# Patient Record
Sex: MALE | Race: WHITE | NOT HISPANIC OR LATINO | URBAN - METROPOLITAN AREA
[De-identification: names, ages, dates, MRNs, and addresses within clinical notes are randomized per-mention and may not be internally consistent; named-entity substitution may affect disease eponyms.]

---

## 2022-02-03 ENCOUNTER — OFFICE VISIT (OUTPATIENT)
Dept: URBAN - METROPOLITAN AREA CLINIC 46 | Facility: CLINIC | Age: 32
End: 2022-02-03
Payer: COMMERCIAL

## 2022-02-03 VITALS
HEIGHT: 68 IN | WEIGHT: 196.8 LBS | DIASTOLIC BLOOD PRESSURE: 68 MMHG | BODY MASS INDEX: 29.83 KG/M2 | HEART RATE: 74 BPM | SYSTOLIC BLOOD PRESSURE: 121 MMHG | TEMPERATURE: 98.7 F

## 2022-02-03 DIAGNOSIS — R19.8 ALTERED BOWEL FUNCTION: ICD-10-CM

## 2022-02-03 PROCEDURE — 99204 OFFICE O/P NEW MOD 45 MIN: CPT | Performed by: INTERNAL MEDICINE

## 2022-02-03 RX ORDER — DEXTROAMPHETAMINE SULFATE, DEXTROAMPHETAMINE SACCHARATE, AMPHETAMINE SULFATE AND AMPHETAMINE ASPARTATE 7.5; 7.5; 7.5; 7.5 MG/1; MG/1; MG/1; MG/1
1 CAPSULE IN THE MORNING CAPSULE, EXTENDED RELEASE ORAL ONCE A DAY
Status: ACTIVE | COMMUNITY

## 2022-02-03 RX ORDER — DICYCLOMINE HYDROCHLORIDE 10 MG/1
1 TABLET CAPSULE ORAL
Qty: 60 | Refills: 3 | OUTPATIENT
Start: 2022-02-03 | End: 2022-06-03

## 2022-02-03 NOTE — HPI-TODAY'S VISIT:
Pt presents from PCP with 1 year of progressive change in bowels with loose stools 5-10 per day; small volume; scattered; occasional bloody mucus; tenesmus. No systemic complaints. No weight loss. No rash or joint pain. No famly hx of CRC or IBD. No testing.

## 2022-02-08 ENCOUNTER — CLAIMS CREATED FROM THE CLAIM WINDOW (OUTPATIENT)
Dept: URBAN - METROPOLITAN AREA CLINIC 4 | Facility: CLINIC | Age: 32
End: 2022-02-08
Payer: COMMERCIAL

## 2022-02-08 ENCOUNTER — OFFICE VISIT (OUTPATIENT)
Dept: URBAN - METROPOLITAN AREA SURGERY CENTER 27 | Facility: SURGERY CENTER | Age: 32
End: 2022-02-08
Payer: COMMERCIAL

## 2022-02-08 DIAGNOSIS — K51.919 ULCERATIVE COLITIS, UNSPECIFIED WITH UNSPECIFIED COMPLICATIONS: ICD-10-CM

## 2022-02-08 DIAGNOSIS — R19.4 ALTERATION IN BOWEL ELIMINATION: ICD-10-CM

## 2022-02-08 DIAGNOSIS — K51.919 ACUTE ULCERATIVE COLITIS WITH COMPLICATION: ICD-10-CM

## 2022-02-08 DIAGNOSIS — K63.89 GASEOUS DISTENTION OF INTESTINE DETERMINED BY X-RAY: ICD-10-CM

## 2022-02-08 DIAGNOSIS — K62.5 ANAL BLEEDING: ICD-10-CM

## 2022-02-08 PROCEDURE — 88305 TISSUE EXAM BY PATHOLOGIST: CPT | Performed by: PATHOLOGY

## 2022-02-08 PROCEDURE — 45380 COLONOSCOPY AND BIOPSY: CPT | Performed by: INTERNAL MEDICINE

## 2022-02-08 PROCEDURE — G8907 PT DOC NO EVENTS ON DISCHARG: HCPCS | Performed by: INTERNAL MEDICINE

## 2022-02-08 RX ORDER — DICYCLOMINE HYDROCHLORIDE 10 MG/1
1 TABLET CAPSULE ORAL
Qty: 60 | Refills: 3 | Status: ACTIVE | COMMUNITY
Start: 2022-02-03 | End: 2022-06-03

## 2022-02-08 RX ORDER — MESALAMINE 375 MG/1
4 CAPSULES IN THE MORNING CAPSULE, EXTENDED RELEASE ORAL ONCE A DAY
Qty: 120 | Refills: 3 | OUTPATIENT
Start: 2022-02-08 | End: 2022-06-08

## 2022-02-08 RX ORDER — BUDESONIDE 9 MG/1
1 TABLET IN THE MORNING TABLET, EXTENDED RELEASE ORAL ONCE A DAY
Qty: 60 | Refills: 0 | OUTPATIENT
Start: 2022-02-08 | End: 2022-03-10

## 2022-02-08 RX ORDER — DEXTROAMPHETAMINE SULFATE, DEXTROAMPHETAMINE SACCHARATE, AMPHETAMINE SULFATE AND AMPHETAMINE ASPARTATE 7.5; 7.5; 7.5; 7.5 MG/1; MG/1; MG/1; MG/1
1 CAPSULE IN THE MORNING CAPSULE, EXTENDED RELEASE ORAL ONCE A DAY
Status: ACTIVE | COMMUNITY

## 2022-03-21 ENCOUNTER — CLAIMS CREATED FROM THE CLAIM WINDOW (OUTPATIENT)
Dept: URBAN - METROPOLITAN AREA CLINIC 48 | Facility: CLINIC | Age: 32
End: 2022-03-21
Payer: COMMERCIAL

## 2022-03-21 VITALS
TEMPERATURE: 98.6 F | WEIGHT: 190 LBS | DIASTOLIC BLOOD PRESSURE: 78 MMHG | SYSTOLIC BLOOD PRESSURE: 129 MMHG | HEIGHT: 68 IN | BODY MASS INDEX: 28.79 KG/M2 | OXYGEN SATURATION: 97 % | HEART RATE: 74 BPM

## 2022-03-21 DIAGNOSIS — K51.30 ULCERATIVE RECTOSIGMOIDITIS WITHOUT COMPLICATION: ICD-10-CM

## 2022-03-21 PROCEDURE — 99213 OFFICE O/P EST LOW 20 MIN: CPT | Performed by: PHYSICIAN ASSISTANT

## 2022-03-21 PROCEDURE — 99213 OFFICE O/P EST LOW 20 MIN: CPT | Performed by: INTERNAL MEDICINE

## 2022-03-21 RX ORDER — MESALAMINE 375 MG/1
4 CAPSULES IN THE MORNING CAPSULE, EXTENDED RELEASE ORAL ONCE A DAY
Qty: 120 | Refills: 3 | Status: ON HOLD | COMMUNITY
Start: 2022-02-08 | End: 2022-06-08

## 2022-03-21 RX ORDER — OXYCODONE HYDROCHLORIDE AND ACETAMINOPHEN 500 MG
1 TABLET TABLET ORAL ONCE A DAY
Status: ACTIVE | COMMUNITY

## 2022-03-21 RX ORDER — PREDNISONE 20 MG/1
1 TABLET TABLET ORAL
Qty: 90 | Refills: 0 | OUTPATIENT
Start: 2022-03-21 | End: 2022-04-20

## 2022-03-21 RX ORDER — LORATADINE 10 MG/1
1 TABLET TABLET ORAL ONCE A DAY
Status: ACTIVE | COMMUNITY

## 2022-03-21 RX ORDER — DEXTROAMPHETAMINE SULFATE, DEXTROAMPHETAMINE SACCHARATE, AMPHETAMINE SULFATE AND AMPHETAMINE ASPARTATE 7.5; 7.5; 7.5; 7.5 MG/1; MG/1; MG/1; MG/1
1 CAPSULE IN THE MORNING CAPSULE, EXTENDED RELEASE ORAL ONCE A DAY
Status: ACTIVE | COMMUNITY

## 2022-03-21 RX ORDER — CHOLECALCIFEROL (VITAMIN D3) 25 MCG
1 TABLET TABLET,CHEWABLE ORAL ONCE A DAY
Status: ACTIVE | COMMUNITY

## 2022-03-21 RX ORDER — DICYCLOMINE HYDROCHLORIDE 10 MG/1
1 TABLET CAPSULE ORAL
Qty: 60 | Refills: 3 | Status: ACTIVE | COMMUNITY
Start: 2022-02-03 | End: 2022-06-03

## 2022-03-21 RX ORDER — ZINC GLUCONATE 50 MG
1 TABLET TABLET ORAL ONCE A DAY
Status: ACTIVE | COMMUNITY

## 2022-03-21 NOTE — HPI-TODAY'S VISIT:
Patrick Stevens is a 32 y/o male who was seen last month for change in bowels with 5-10 loose stools/day, occasional bloody mucoid stool, and tenesmus. He was given Dicyclomine and Colonoscopy was performed which reavled Ulcerative Colitis confirmed by Bx in the rectosigmoid colon. He was given Uceris and Apriso, but has not taken either due to cost (one was $1000 and the other $400). He gets some symptomatic relief with Dicyclomine, but overall symptoms are unchanged. He has not seen any blood in the stool recently, and believes he has lost 5-6 pounds in the last few months.

## 2022-06-03 ENCOUNTER — OFFICE VISIT (OUTPATIENT)
Dept: URBAN - METROPOLITAN AREA CLINIC 46 | Facility: CLINIC | Age: 32
End: 2022-06-03

## 2022-06-03 ENCOUNTER — OFFICE VISIT (OUTPATIENT)
Dept: URBAN - METROPOLITAN AREA CLINIC 46 | Facility: CLINIC | Age: 32
End: 2022-06-03
Payer: COMMERCIAL

## 2022-06-03 VITALS
HEIGHT: 68 IN | BODY MASS INDEX: 29.37 KG/M2 | SYSTOLIC BLOOD PRESSURE: 115 MMHG | DIASTOLIC BLOOD PRESSURE: 71 MMHG | HEART RATE: 81 BPM | TEMPERATURE: 98.7 F | WEIGHT: 193.8 LBS

## 2022-06-03 DIAGNOSIS — K51.30 ULCERATIVE RECTOSIGMOIDITIS WITHOUT COMPLICATION: ICD-10-CM

## 2022-06-03 PROCEDURE — 99214 OFFICE O/P EST MOD 30 MIN: CPT | Performed by: INTERNAL MEDICINE

## 2022-06-03 RX ORDER — OXYCODONE HYDROCHLORIDE AND ACETAMINOPHEN 500 MG
1 TABLET TABLET ORAL ONCE A DAY
Status: ACTIVE | COMMUNITY

## 2022-06-03 RX ORDER — CHOLECALCIFEROL (VITAMIN D3) 25 MCG
1 TABLET TABLET,CHEWABLE ORAL ONCE A DAY
Status: ACTIVE | COMMUNITY

## 2022-06-03 RX ORDER — LORATADINE 10 MG/1
1 TABLET TABLET ORAL ONCE A DAY
Status: ACTIVE | COMMUNITY

## 2022-06-03 RX ORDER — DICYCLOMINE HYDROCHLORIDE 10 MG/1
1 TABLET CAPSULE ORAL
Qty: 60 | Refills: 3 | Status: ACTIVE | COMMUNITY
Start: 2022-02-03 | End: 2022-06-03

## 2022-06-03 RX ORDER — DEXTROAMPHETAMINE SULFATE, DEXTROAMPHETAMINE SACCHARATE, AMPHETAMINE SULFATE AND AMPHETAMINE ASPARTATE 7.5; 7.5; 7.5; 7.5 MG/1; MG/1; MG/1; MG/1
1 CAPSULE IN THE MORNING CAPSULE, EXTENDED RELEASE ORAL ONCE A DAY
Status: ACTIVE | COMMUNITY

## 2022-06-03 RX ORDER — PREDNISONE 10 MG/1
1 TABLET TABLET ORAL ONCE A DAY
Status: ACTIVE | COMMUNITY

## 2022-06-03 RX ORDER — PREDNISONE 10 MG/1
1 TABLET TABLET ORAL ONCE A DAY
Qty: 60 TABLET | Refills: 1

## 2022-06-03 RX ORDER — ZINC GLUCONATE 50 MG
1 TABLET TABLET ORAL ONCE A DAY
Status: ACTIVE | COMMUNITY

## 2022-06-03 NOTE — HPI-TODAY'S VISIT:
Pt presented 2/2022 with a  change in bowels with 5-10 loose stools/day, occasional bloody mucoid stool, and tenesmus. Colonoscopy was performed which reavled moderate to severe Ulcerative Colitis confirmed by Bx to the sigmoid colon. Pt  was given Uceris and Apriso but did not fill due to cost. Seen for a follow up 3/2022 and Prednisone started. Pt now presents for a follow up; Took 20mg for 4 weeks and then 10mg for 4 weeks and now taking 10mg QOD. Feels 90% better. 2 BM daily that are soft (was 5-6); no blood; no mucus; tenemsum resolved. No EIM's of IBD. No side effects from Prednisone.

## 2022-06-27 ENCOUNTER — ERX REFILL RESPONSE (OUTPATIENT)
Dept: URBAN - METROPOLITAN AREA CLINIC 46 | Facility: CLINIC | Age: 32
End: 2022-06-27

## 2022-06-27 RX ORDER — DICYCLOMINE HYDROCHLORIDE 10 MG/1
TAKE ONE CAPSULE BY MOUTH TWICE A DAY FOR SPASMS CAPSULE ORAL
Qty: 60 CAPSULE | Refills: 0 | OUTPATIENT

## 2022-06-27 RX ORDER — DICYCLOMINE HYDROCHLORIDE 10 MG/1
TAKE ONE CAPSULE BY MOUTH TWICE A DAY FOR SPASMS CAPSULE ORAL
Qty: 60 CAPSULE | Refills: 5 | OUTPATIENT

## 2022-09-01 ENCOUNTER — CLAIMS CREATED FROM THE CLAIM WINDOW (OUTPATIENT)
Dept: URBAN - METROPOLITAN AREA CLINIC 46 | Facility: CLINIC | Age: 32
End: 2022-09-01
Payer: COMMERCIAL

## 2022-09-01 ENCOUNTER — OFFICE VISIT (OUTPATIENT)
Dept: URBAN - METROPOLITAN AREA CLINIC 46 | Facility: CLINIC | Age: 32
End: 2022-09-01

## 2022-09-01 ENCOUNTER — CLAIMS CREATED FROM THE CLAIM WINDOW (OUTPATIENT)
Dept: URBAN - METROPOLITAN AREA CLINIC 46 | Facility: CLINIC | Age: 32
End: 2022-09-01

## 2022-09-01 VITALS
BODY MASS INDEX: 30.74 KG/M2 | SYSTOLIC BLOOD PRESSURE: 130 MMHG | WEIGHT: 202.8 LBS | TEMPERATURE: 98.4 F | DIASTOLIC BLOOD PRESSURE: 79 MMHG | HEART RATE: 69 BPM | HEIGHT: 68 IN

## 2022-09-01 DIAGNOSIS — K51.311 ULCERATIVE RECTOSIGMOIDITIS WITH RECTAL BLEEDING: ICD-10-CM

## 2022-09-01 PROCEDURE — 99213 OFFICE O/P EST LOW 20 MIN: CPT | Performed by: INTERNAL MEDICINE

## 2022-09-01 RX ORDER — CHOLECALCIFEROL (VITAMIN D3) 25 MCG
1 TABLET TABLET,CHEWABLE ORAL ONCE A DAY
Status: ACTIVE | COMMUNITY

## 2022-09-01 RX ORDER — LORATADINE 10 MG/1
1 TABLET TABLET ORAL ONCE A DAY
Status: ACTIVE | COMMUNITY

## 2022-09-01 RX ORDER — OXYCODONE HYDROCHLORIDE AND ACETAMINOPHEN 500 MG
1 TABLET TABLET ORAL ONCE A DAY
Status: ACTIVE | COMMUNITY

## 2022-09-01 RX ORDER — DEXTROAMPHETAMINE SULFATE, DEXTROAMPHETAMINE SACCHARATE, AMPHETAMINE SULFATE AND AMPHETAMINE ASPARTATE 7.5; 7.5; 7.5; 7.5 MG/1; MG/1; MG/1; MG/1
1 CAPSULE IN THE MORNING CAPSULE, EXTENDED RELEASE ORAL ONCE A DAY
Status: ACTIVE | COMMUNITY

## 2022-09-01 RX ORDER — ZINC GLUCONATE 50 MG
1 TABLET TABLET ORAL ONCE A DAY
Status: ACTIVE | COMMUNITY

## 2022-09-01 RX ORDER — MESALAMINE 375 MG/1
4 CAPSULES IN THE MORNING CAPSULE, EXTENDED RELEASE ORAL ONCE A DAY
Qty: 120 | Refills: 5 | OUTPATIENT
Start: 2022-09-01 | End: 2023-02-27

## 2022-09-01 RX ORDER — DICYCLOMINE HYDROCHLORIDE 10 MG/1
TAKE ONE CAPSULE BY MOUTH TWICE A DAY FOR SPASMS CAPSULE ORAL
Qty: 60 CAPSULE | Refills: 5 | Status: ACTIVE | COMMUNITY

## 2022-09-01 RX ORDER — DICYCLOMINE HYDROCHLORIDE 10 MG/1
TAKE ONE CAPSULE BY MOUTH TWICE A DAY FOR SPASMS CAPSULE ORAL
OUTPATIENT

## 2022-09-01 NOTE — HPI-TODAY'S VISIT:
Pt presented 2/2022 with a  change in bowels with 5-10 loose stools/day, occasional bloody mucoid stool, and tenesmus. Colonoscopy was performed which reavled moderate to severe Ulcerative Colitis confirmed by Bx to the sigmoid colon. Pt  was given Uceris and Apriso but did not fill due to cost. Seen for a follow up 3/2022 and Prednisone started.   6/2022: Pt now presents for a follow up; Took 20mg for 4 weeks and then 10mg for 4 weeks and now taking 10mg QOD. Feels 90% better. 2 BM daily that are soft (was 5-6); no blood; no mucus; tenemsum resolved. No EIM's of IBD. No side effects from Prednisone. Plan was to take Prednisone 10mg daily for 4 more weeks then taper to off over 2 more weeks.   9/1/2022: Off Prednisone for 6-8 weeks and no severe flare. Still 1 time a week gets some diarrhea 4 in a day and soft. Other days formed. No bleeding. No EIMs of IBD. To have labs at PCP next months

## 2022-09-28 ENCOUNTER — TELEPHONE ENCOUNTER (OUTPATIENT)
Dept: URBAN - METROPOLITAN AREA CLINIC 46 | Facility: CLINIC | Age: 32
End: 2022-09-28

## 2022-09-28 RX ORDER — BALSALAZIDE DISODIUM 750 MG/1
3 CAPSULES CAPSULE ORAL TWICE A DAY
Qty: 180 CAPSULE | Refills: 3 | OUTPATIENT
Start: 2022-09-30 | End: 2023-01-27

## 2023-01-10 ENCOUNTER — TELEPHONE ENCOUNTER (OUTPATIENT)
Dept: URBAN - METROPOLITAN AREA CLINIC 46 | Facility: CLINIC | Age: 33
End: 2023-01-10

## 2023-01-10 ENCOUNTER — OFFICE VISIT (OUTPATIENT)
Dept: URBAN - METROPOLITAN AREA CLINIC 44 | Facility: CLINIC | Age: 33
End: 2023-01-10
Payer: COMMERCIAL

## 2023-01-10 ENCOUNTER — LAB OUTSIDE AN ENCOUNTER (OUTPATIENT)
Dept: URBAN - METROPOLITAN AREA CLINIC 44 | Facility: CLINIC | Age: 33
End: 2023-01-10

## 2023-01-10 VITALS
WEIGHT: 212.4 LBS | TEMPERATURE: 98.1 F | SYSTOLIC BLOOD PRESSURE: 132 MMHG | BODY MASS INDEX: 32.19 KG/M2 | HEIGHT: 68 IN | DIASTOLIC BLOOD PRESSURE: 79 MMHG | HEART RATE: 70 BPM

## 2023-01-10 DIAGNOSIS — K51.311 ULCERATIVE RECTOSIGMOIDITIS WITH RECTAL BLEEDING: ICD-10-CM

## 2023-01-10 PROCEDURE — 99214 OFFICE O/P EST MOD 30 MIN: CPT | Performed by: INTERNAL MEDICINE

## 2023-01-10 RX ORDER — DICYCLOMINE HYDROCHLORIDE 10 MG/1
TAKE ONE CAPSULE BY MOUTH TWICE A DAY FOR SPASMS CAPSULE ORAL
Status: ACTIVE | COMMUNITY

## 2023-01-10 RX ORDER — OZANIMOD HYDROCHLORIDE 0.23-0.46
AS DIRECTED KIT ORAL ONCE A DAY
Qty: 1 BLISTER | Refills: 0 | OUTPATIENT
Start: 2023-01-10 | End: 2023-01-17

## 2023-01-10 RX ORDER — ZINC GLUCONATE 50 MG
1 TABLET TABLET ORAL ONCE A DAY
Status: ACTIVE | COMMUNITY

## 2023-01-10 RX ORDER — OXYCODONE HYDROCHLORIDE AND ACETAMINOPHEN 500 MG
1 TABLET TABLET ORAL ONCE A DAY
Status: ACTIVE | COMMUNITY

## 2023-01-10 RX ORDER — OZANIMOD HYDROCHLORIDE 0.92 MG/1
1 CAPSULE CAPSULE ORAL ONCE A DAY
Qty: 30 | Refills: 3 | OUTPATIENT
Start: 2023-01-10 | End: 2023-05-10

## 2023-01-10 RX ORDER — PREDNISONE 10 MG/1
2 TABLETS ONCE A DAY FOR 2 WEEKS THEN 1 TABLET ONCE A DAY FOR 2 WEEKS TABLET ORAL ONCE A DAY
Qty: 45 | Refills: 0 | OUTPATIENT
Start: 2023-01-10 | End: 2023-02-09

## 2023-01-10 RX ORDER — CHOLECALCIFEROL (VITAMIN D3) 25 MCG
1 TABLET TABLET,CHEWABLE ORAL ONCE A DAY
Status: ACTIVE | COMMUNITY

## 2023-01-10 RX ORDER — DEXTROAMPHETAMINE SULFATE, DEXTROAMPHETAMINE SACCHARATE, AMPHETAMINE SULFATE AND AMPHETAMINE ASPARTATE 7.5; 7.5; 7.5; 7.5 MG/1; MG/1; MG/1; MG/1
1 CAPSULE IN THE MORNING CAPSULE, EXTENDED RELEASE ORAL ONCE A DAY
Status: ACTIVE | COMMUNITY

## 2023-01-10 RX ORDER — LORATADINE 10 MG/1
1 TABLET TABLET ORAL ONCE A DAY
Status: ACTIVE | COMMUNITY

## 2023-01-10 NOTE — HPI-TODAY'S VISIT:
Pt presented 2/2022 with a  change in bowels with 5-10 loose stools/day, occasional bloody mucoid stool, and tenesmus. Colonoscopy was performed which reavled moderate to severe Ulcerative Colitis confirmed by Bx to the sigmoid colon. Pt  was given Uceris and Apriso but did not fill due to cost. Seen for a follow up 3/2022 and Prednisone started.   6/2022: Pt now presents for a follow up; Took 20mg for 4 weeks and then 10mg for 4 weeks and now taking 10mg QOD. Feels 90% better. 2 BM daily that are soft (was 5-6); no blood; no mucus; tenemsum resolved. No EIM's of IBD. No side effects from Prednisone. Plan was to take Prednisone 10mg daily for 4 more weeks then taper to off over 2 more weeks.   9/1/2022: Off Prednisone for 6-8 weeks and no severe flare. Still 1 time a week gets some diarrhea 4 in a day and soft. Other days formed. No bleeding. No EIMs of IBD. To have labs at PCP next months. Plan was to start oral mesalamine.   1/10/2023: Flare for the past 2 weeks with pressure in abdomen and progressive loose stools with bleeding. No EIM's of IBD. Was  not able to get generic mesalamine that was rx'd lst visit.

## 2023-02-22 ENCOUNTER — TELEPHONE ENCOUNTER (OUTPATIENT)
Dept: URBAN - METROPOLITAN AREA CLINIC 44 | Facility: CLINIC | Age: 33
End: 2023-02-22

## 2023-02-22 PROBLEM — 41364008: Status: ACTIVE | Noted: 2022-03-21

## 2023-03-02 ENCOUNTER — TELEPHONE ENCOUNTER (OUTPATIENT)
Dept: URBAN - METROPOLITAN AREA CLINIC 46 | Facility: CLINIC | Age: 33
End: 2023-03-02

## 2023-03-02 RX ORDER — PREDNISONE 10 MG/1
2 TABLETS ONCE A DAY FOR 2 WEEKS THEN 1 TABLET ONCE A DAY FOR 2 WEEKS TABLET ORAL ONCE A DAY
Qty: 180 | Refills: 0
Start: 2023-01-10 | End: 2023-05-31

## 2023-03-06 ENCOUNTER — LAB OUTSIDE AN ENCOUNTER (OUTPATIENT)
Dept: URBAN - METROPOLITAN AREA CLINIC 44 | Facility: CLINIC | Age: 33
End: 2023-03-06

## 2023-03-07 ENCOUNTER — OFFICE VISIT (OUTPATIENT)
Dept: URBAN - METROPOLITAN AREA CLINIC 44 | Facility: CLINIC | Age: 33
End: 2023-03-07

## 2023-03-08 LAB — HEPATITIS B SURFACE ANTIGEN: (no result)

## 2023-03-09 ENCOUNTER — TELEPHONE ENCOUNTER (OUTPATIENT)
Dept: URBAN - METROPOLITAN AREA CLINIC 46 | Facility: CLINIC | Age: 33
End: 2023-03-09

## 2023-03-11 LAB
MITOGEN-NIL: >10
QUANTIFERON NIL VALUE: 0.03
QUANTIFERON TB1 AG VALUE: 0
QUANTIFERON TB2 AG VALUE: 0
QUANTIFERON-TB GOLD PLUS: NEGATIVE

## 2023-03-16 ENCOUNTER — P2P PATIENT RECORD (OUTPATIENT)
Age: 33
End: 2023-03-16

## 2023-03-20 ENCOUNTER — TELEPHONE ENCOUNTER (OUTPATIENT)
Dept: URBAN - METROPOLITAN AREA CLINIC 44 | Facility: CLINIC | Age: 33
End: 2023-03-20

## 2023-03-20 RX ORDER — OZANIMOD HYDROCHLORIDE 0.92 MG/1
1 CAPSULE CAPSULE ORAL ONCE A DAY
Qty: 90 | Refills: 0
Start: 2023-01-10 | End: 2023-06-18

## 2023-03-31 ENCOUNTER — TELEPHONE ENCOUNTER (OUTPATIENT)
Dept: URBAN - METROPOLITAN AREA CLINIC 46 | Facility: CLINIC | Age: 33
End: 2023-03-31

## 2023-04-07 ENCOUNTER — P2P PATIENT RECORD (OUTPATIENT)
Age: 33
End: 2023-04-07

## 2023-05-02 ENCOUNTER — OFFICE VISIT (OUTPATIENT)
Dept: URBAN - METROPOLITAN AREA CLINIC 44 | Facility: CLINIC | Age: 33
End: 2023-05-02
Payer: COMMERCIAL

## 2023-05-02 VITALS
SYSTOLIC BLOOD PRESSURE: 137 MMHG | HEIGHT: 68 IN | DIASTOLIC BLOOD PRESSURE: 84 MMHG | HEART RATE: 63 BPM | BODY MASS INDEX: 30.89 KG/M2 | TEMPERATURE: 98.4 F | OXYGEN SATURATION: 99 % | WEIGHT: 203.8 LBS

## 2023-05-02 DIAGNOSIS — K51.311 ULCERATIVE RECTOSIGMOIDITIS WITH RECTAL BLEEDING: ICD-10-CM

## 2023-05-02 PROCEDURE — 99214 OFFICE O/P EST MOD 30 MIN: CPT | Performed by: INTERNAL MEDICINE

## 2023-05-02 RX ORDER — LORATADINE 10 MG/1
1 TABLET TABLET ORAL ONCE A DAY
Status: ACTIVE | COMMUNITY

## 2023-05-02 RX ORDER — ZINC GLUCONATE 50 MG
1 TABLET TABLET ORAL ONCE A DAY
Status: ACTIVE | COMMUNITY

## 2023-05-02 RX ORDER — OZANIMOD HYDROCHLORIDE 0.92 MG/1
1 CAPSULE CAPSULE ORAL ONCE A DAY
OUTPATIENT
Start: 2023-01-10

## 2023-05-02 RX ORDER — BUDESONIDE 3 MG/1
3 CAPSULES CAPSULE ORAL ONCE A DAY
Qty: 90 CAPSULE | Refills: 1 | OUTPATIENT
Start: 2023-05-02

## 2023-05-02 RX ORDER — OZANIMOD HYDROCHLORIDE 0.92 MG/1
1 CAPSULE CAPSULE ORAL ONCE A DAY
Qty: 90 | Refills: 0 | Status: ACTIVE | COMMUNITY
Start: 2023-01-10 | End: 2023-06-18

## 2023-05-02 RX ORDER — OXYCODONE HYDROCHLORIDE AND ACETAMINOPHEN 500 MG
1 TABLET TABLET ORAL ONCE A DAY
Status: ACTIVE | COMMUNITY

## 2023-05-02 RX ORDER — DEXTROAMPHETAMINE SULFATE, DEXTROAMPHETAMINE SACCHARATE, AMPHETAMINE SULFATE AND AMPHETAMINE ASPARTATE 7.5; 7.5; 7.5; 7.5 MG/1; MG/1; MG/1; MG/1
1 CAPSULE IN THE MORNING CAPSULE, EXTENDED RELEASE ORAL ONCE A DAY
Status: ACTIVE | COMMUNITY

## 2023-05-02 RX ORDER — CHOLECALCIFEROL (VITAMIN D3) 25 MCG
1 TABLET TABLET,CHEWABLE ORAL ONCE A DAY
Status: ACTIVE | COMMUNITY

## 2023-05-02 RX ORDER — DICYCLOMINE HYDROCHLORIDE 10 MG/1
TAKE ONE CAPSULE BY MOUTH TWICE A DAY FOR SPASMS CAPSULE ORAL
Status: ACTIVE | COMMUNITY

## 2023-05-02 NOTE — HPI-TODAY'S VISIT:
Pt presented 2/2022 with a  change in bowels with 5-10 loose stools/day, occasional bloody mucoid stool, and tenesmus. Colonoscopy was performed which reavled moderate to severe Ulcerative Colitis confirmed by Bx to the sigmoid colon. Pt  was given Uceris and Apriso but did not fill due to cost.   Seen for a follow up 3/2022 and Prednisone started.   6/2022: Pt now presents for a follow up; Took 20mg for 4 weeks and then 10mg for 4 weeks and now taking 10mg QOD. Feels 90% better. 2 BM daily that are soft (was 5-6); no blood; no mucus; tenemsum resolved. No EIM's of IBD. No side effects from Prednisone.   Plan was to take Prednisone 10mg daily for 4 more weeks then taper to off over 2 more weeks.   9/1/2022: Off Prednisone for 6-8 weeks and no severe flare. Still 1 time a week gets some diarrhea 4 in a day and soft. Other days formed. No bleeding. No EIMs of IBD. To have labs at PCP next months. Plan was to start oral mesalamine.   1/10/2023: Flare for the past 2 weeks with pressure in abdomen and progressive loose stools with bleeding. No EIM's of IBD. Was  not able to get generic mesalamine that was rx'd lst visit and on no maitenance therapy. Plan was to start Zeposia and bridge with prednisone.   Pt took 20mg prednisone for 4 weeks with mild improvement but Zeposia approval waiting. Called 3/2023 with worsening symptoms again and 4 more weeks of Prednisone called in. Pt states minimal change in symptoms. Prednisone stopped aroung same time Zeposia was started 4 weeks ago.   5/2/2023: For a follow up. On Zepsosia for 4 weeks and off Prednisone. States still BM in AM and small volume with urgency and some blood. Rest of day 2 BM and more formed. No weight loss. No fevers. No EIM's of IBD.

## 2023-05-04 ENCOUNTER — ERX REFILL RESPONSE (OUTPATIENT)
Dept: URBAN - METROPOLITAN AREA CLINIC 46 | Facility: CLINIC | Age: 33
End: 2023-05-04

## 2023-05-04 RX ORDER — DICYCLOMINE HYDROCHLORIDE 10 MG/1
TAKE ONE CAPSULE BY MOUTH TWICE A DAY FOR SPASMS CAPSULE ORAL
Qty: 60 CAPSULE | Refills: 43 | OUTPATIENT

## 2023-05-04 RX ORDER — DICYCLOMINE HYDROCHLORIDE 10 MG/1
TAKE ONE CAPSULE BY MOUTH TWICE A DAY FOR SPASMS CAPSULE ORAL
Qty: 60 CAPSULE | Refills: 0 | OUTPATIENT

## 2023-06-23 ENCOUNTER — ERX REFILL RESPONSE (OUTPATIENT)
Dept: URBAN - METROPOLITAN AREA CLINIC 44 | Facility: CLINIC | Age: 33
End: 2023-06-23

## 2023-06-23 RX ORDER — OZANIMOD HYDROCHLORIDE 0.92 MG/1
1 CAPSULE CAPSULE ORAL ONCE A DAY
OUTPATIENT

## 2023-06-23 RX ORDER — OZANIMOD HYDROCHLORIDE 0.92 MG/1
1 CAPSULE CAPSULE ORAL ONCE A DAY
Qty: 30 | Refills: 11 | OUTPATIENT

## 2023-06-30 ENCOUNTER — ERX REFILL RESPONSE (OUTPATIENT)
Dept: URBAN - METROPOLITAN AREA CLINIC 44 | Facility: CLINIC | Age: 33
End: 2023-06-30

## 2023-06-30 RX ORDER — BUDESONIDE 3 MG/1
TAKE THREE CAPSULES BY MOUTH DAILY CAPSULE, COATED PELLETS ORAL
Qty: 90 CAPSULE | Refills: 3 | OUTPATIENT

## 2023-06-30 RX ORDER — BUDESONIDE 3 MG/1
3 CAPSULES CAPSULE ORAL ONCE A DAY
Qty: 90 CAPSULE | Refills: 1 | OUTPATIENT

## 2023-07-25 ENCOUNTER — OFFICE VISIT (OUTPATIENT)
Dept: URBAN - METROPOLITAN AREA CLINIC 44 | Facility: CLINIC | Age: 33
End: 2023-07-25
Payer: COMMERCIAL

## 2023-07-25 VITALS
TEMPERATURE: 98.3 F | SYSTOLIC BLOOD PRESSURE: 128 MMHG | HEART RATE: 67 BPM | DIASTOLIC BLOOD PRESSURE: 83 MMHG | HEIGHT: 68 IN | WEIGHT: 195 LBS | BODY MASS INDEX: 29.55 KG/M2

## 2023-07-25 DIAGNOSIS — K51.311 ULCERATIVE RECTOSIGMOIDITIS WITH RECTAL BLEEDING: ICD-10-CM

## 2023-07-25 PROBLEM — 41364008: Status: ACTIVE | Noted: 2022-02-08

## 2023-07-25 PROCEDURE — 99213 OFFICE O/P EST LOW 20 MIN: CPT | Performed by: INTERNAL MEDICINE

## 2023-07-25 RX ORDER — OZANIMOD HYDROCHLORIDE 0.92 MG/1
1 CAPSULE CAPSULE ORAL ONCE A DAY
OUTPATIENT

## 2023-07-25 RX ORDER — OXYCODONE HYDROCHLORIDE AND ACETAMINOPHEN 500 MG
1 TABLET TABLET ORAL ONCE A DAY
Status: ACTIVE | COMMUNITY

## 2023-07-25 RX ORDER — ZINC GLUCONATE 50 MG
1 TABLET TABLET ORAL ONCE A DAY
Status: ACTIVE | COMMUNITY

## 2023-07-25 RX ORDER — DICYCLOMINE HYDROCHLORIDE 10 MG/1
TAKE ONE CAPSULE BY MOUTH TWICE A DAY FOR SPASMS CAPSULE ORAL
Qty: 60 CAPSULE | Refills: 43 | Status: ACTIVE | COMMUNITY

## 2023-07-25 RX ORDER — DEXTROAMPHETAMINE SULFATE, DEXTROAMPHETAMINE SACCHARATE, AMPHETAMINE SULFATE AND AMPHETAMINE ASPARTATE 7.5; 7.5; 7.5; 7.5 MG/1; MG/1; MG/1; MG/1
1 CAPSULE IN THE MORNING CAPSULE, EXTENDED RELEASE ORAL ONCE A DAY
Status: ACTIVE | COMMUNITY

## 2023-07-25 RX ORDER — OZANIMOD HYDROCHLORIDE 0.92 MG/1
1 CAPSULE CAPSULE ORAL ONCE A DAY
Qty: 30 | Refills: 11 | Status: ACTIVE | COMMUNITY

## 2023-07-25 RX ORDER — BUDESONIDE 3 MG/1
TAKE THREE CAPSULES BY MOUTH DAILY CAPSULE, COATED PELLETS ORAL
Qty: 90 CAPSULE | Refills: 3 | Status: ACTIVE | COMMUNITY

## 2023-07-25 RX ORDER — CHOLECALCIFEROL (VITAMIN D3) 25 MCG
1 TABLET TABLET,CHEWABLE ORAL ONCE A DAY
Status: ACTIVE | COMMUNITY

## 2023-07-25 RX ORDER — LORATADINE 10 MG/1
1 TABLET TABLET ORAL ONCE A DAY
Status: ACTIVE | COMMUNITY

## 2023-07-25 RX ORDER — BUDESONIDE 3 MG/1
2 CAPSULES CAPSULE ORAL ONCE A DAY
Qty: 60 CAPSULE | Refills: 1 | OUTPATIENT

## 2023-07-25 NOTE — HPI-TODAY'S VISIT:
Pt presented 2/2022 with a  change in bowels with 5-10 loose stools/day, occasional bloody mucoid stool, and tenesmus. Colonoscopy was performed which reavled moderate to severe Ulcerative Colitis confirmed by Bx to the sigmoid colon. Pt  was given Uceris and Apriso but did not fill due to cost.   Seen for a follow up 3/2022 and Prednisone started.   6/2022: Pt now presents for a follow up; Took 20mg for 4 weeks and then 10mg for 4 weeks and now taking 10mg QOD. Feels 90% better. 2 BM daily that are soft (was 5-6); no blood; no mucus; tenemsum resolved. No EIM's of IBD. No side effects from Prednisone.   Plan was to take Prednisone 10mg daily for 4 more weeks then taper to off over 2 more weeks.   9/1/2022: Off Prednisone for 6-8 weeks and no severe flare. Still 1 time a week gets some diarrhea 4 in a day and soft. Other days formed. No bleeding. No EIMs of IBD. To have labs at PCP next months. Plan was to start oral mesalamine.   1/10/2023: Flare for the past 2 weeks with pressure in abdomen and progressive loose stools with bleeding. No EIM's of IBD. Was  not able to get generic mesalamine that was rx'd lst visit and on no maitenance therapy. Plan was to start Zeposia and bridge with prednisone.   Pt took 20mg prednisone for 4 weeks with mild improvement but Zeposia approval waiting. Called 3/2023 with worsening symptoms again and 4 more weeks of Prednisone called in. Pt states minimal change in symptoms. Prednisone stopped aroung same time Zeposia was started 4 weeks ago.   5/2/2023: For a follow up. On Zepsosia for 4 weeks and off Prednisone. States still BM in AM and small volume with urgency and some blood. Rest of day 2 BM and more formed. No weight loss. No fevers. No EIM's of IBD. Plan was to continue Zeposia and start Budesonide. Check fecal calpro.  7/25/2023: For a follow up; is better since last visit. On Zeposia once a day and taking 9mg Budesonide ER. 2-4 BM and no blood and less urgency. No fatigue or EIM's of IBD.

## 2023-09-07 ENCOUNTER — TELEPHONE ENCOUNTER (OUTPATIENT)
Dept: URBAN - METROPOLITAN AREA CLINIC 46 | Facility: CLINIC | Age: 33
End: 2023-09-07

## 2023-09-07 ENCOUNTER — TELEPHONE ENCOUNTER (OUTPATIENT)
Dept: URBAN - METROPOLITAN AREA CLINIC 44 | Facility: CLINIC | Age: 33
End: 2023-09-07

## 2023-09-07 RX ORDER — BUDESONIDE 3 MG/1
2 CAPSULES CAPSULE ORAL ONCE A DAY
Qty: 60 CAPSULE | Refills: 1

## 2023-09-07 RX ORDER — OZANIMOD HYDROCHLORIDE 0.92 MG/1: 1 CAPSULE CAPSULE ORAL ONCE A DAY

## 2023-09-12 ENCOUNTER — TELEPHONE ENCOUNTER (OUTPATIENT)
Dept: URBAN - METROPOLITAN AREA CLINIC 43 | Facility: CLINIC | Age: 33
End: 2023-09-12

## 2023-09-20 ENCOUNTER — TELEPHONE ENCOUNTER (OUTPATIENT)
Dept: URBAN - METROPOLITAN AREA CLINIC 44 | Facility: CLINIC | Age: 33
End: 2023-09-20

## 2023-09-27 ENCOUNTER — P2P PATIENT RECORD (OUTPATIENT)
Age: 33
End: 2023-09-27

## 2023-10-11 ENCOUNTER — TELEPHONE ENCOUNTER (OUTPATIENT)
Dept: URBAN - METROPOLITAN AREA CLINIC 46 | Facility: CLINIC | Age: 33
End: 2023-10-11

## 2023-10-19 ENCOUNTER — WEB ENCOUNTER (OUTPATIENT)
Dept: URBAN - METROPOLITAN AREA CLINIC 44 | Facility: CLINIC | Age: 33
End: 2023-10-19

## 2023-10-24 ENCOUNTER — TELEPHONE ENCOUNTER (OUTPATIENT)
Dept: URBAN - METROPOLITAN AREA CLINIC 44 | Facility: CLINIC | Age: 33
End: 2023-10-24

## 2023-10-26 ENCOUNTER — TELEPHONE ENCOUNTER (OUTPATIENT)
Dept: URBAN - METROPOLITAN AREA CLINIC 44 | Facility: CLINIC | Age: 33
End: 2023-10-26

## 2023-11-16 ENCOUNTER — OFFICE VISIT (OUTPATIENT)
Dept: URBAN - METROPOLITAN AREA CLINIC 48 | Facility: CLINIC | Age: 33
End: 2023-11-16
Payer: COMMERCIAL

## 2023-11-16 VITALS
BODY MASS INDEX: 29.64 KG/M2 | HEART RATE: 66 BPM | SYSTOLIC BLOOD PRESSURE: 141 MMHG | WEIGHT: 195.6 LBS | OXYGEN SATURATION: 99 % | HEIGHT: 68 IN | DIASTOLIC BLOOD PRESSURE: 76 MMHG | TEMPERATURE: 97.9 F

## 2023-11-16 DIAGNOSIS — K51.311 ULCERATIVE RECTOSIGMOIDITIS WITH RECTAL BLEEDING: ICD-10-CM

## 2023-11-16 PROCEDURE — 99214 OFFICE O/P EST MOD 30 MIN: CPT | Performed by: INTERNAL MEDICINE

## 2023-11-16 RX ORDER — ZINC GLUCONATE 50 MG
1 TABLET TABLET ORAL ONCE A DAY
Status: ACTIVE | COMMUNITY

## 2023-11-16 RX ORDER — DICYCLOMINE HYDROCHLORIDE 10 MG/1
TAKE ONE CAPSULE BY MOUTH TWICE A DAY FOR SPASMS CAPSULE ORAL
Qty: 60 CAPSULE | Refills: 43 | Status: ACTIVE | COMMUNITY

## 2023-11-16 RX ORDER — BUDESONIDE 3 MG/1
3 CAPSULES CAPSULE ORAL ONCE A DAY
Qty: 90 CAPSULE | Refills: 1 | OUTPATIENT
Start: 2023-11-16

## 2023-11-16 RX ORDER — LORATADINE 10 MG/1
1 TABLET TABLET ORAL ONCE A DAY
Status: ACTIVE | COMMUNITY

## 2023-11-16 RX ORDER — BUDESONIDE 3 MG/1
TAKE THREE CAPSULES BY MOUTH DAILY CAPSULE, COATED PELLETS ORAL
Qty: 90 CAPSULE | Refills: 3 | Status: ACTIVE | COMMUNITY

## 2023-11-16 RX ORDER — OZANIMOD HYDROCHLORIDE 0.92 MG/1
1 CAPSULE CAPSULE ORAL ONCE A DAY
Qty: 30 | Refills: 11 | Status: ACTIVE | COMMUNITY

## 2023-11-16 RX ORDER — OXYCODONE HYDROCHLORIDE AND ACETAMINOPHEN 500 MG
1 TABLET TABLET ORAL ONCE A DAY
Status: ACTIVE | COMMUNITY

## 2023-11-16 RX ORDER — CHOLECALCIFEROL (VITAMIN D3) 25 MCG
1 TABLET TABLET,CHEWABLE ORAL ONCE A DAY
Status: ACTIVE | COMMUNITY

## 2023-11-16 RX ORDER — DEXTROAMPHETAMINE SULFATE, DEXTROAMPHETAMINE SACCHARATE, AMPHETAMINE SULFATE AND AMPHETAMINE ASPARTATE 7.5; 7.5; 7.5; 7.5 MG/1; MG/1; MG/1; MG/1
1 CAPSULE IN THE MORNING CAPSULE, EXTENDED RELEASE ORAL ONCE A DAY
Status: ACTIVE | COMMUNITY

## 2023-11-16 NOTE — HPI-TODAY'S VISIT:
Pt presented 2/2022 with a  change in bowels with 5-10 loose stools/day, occasional bloody mucoid stool, and tenesmus. Colonoscopy was performed which reavled moderate to severe Ulcerative Colitis confirmed by Bx to the sigmoid colon. Pt  was given Uceris and Apriso but did not fill due to cost.   Seen for a follow up 3/2022 and Prednisone started.   6/2022: Pt now presents for a follow up; Took 20mg for 4 weeks and then 10mg for 4 weeks and now taking 10mg QOD. Feels 90% better. 2 BM daily that are soft (was 5-6); no blood; no mucus; tenemsum resolved. No EIM's of IBD. No side effects from Prednisone.   Plan was to take Prednisone 10mg daily for 4 more weeks then taper to off over 2 more weeks.   9/1/2022: Off Prednisone for 6-8 weeks and no severe flare. Still 1 time a week gets some diarrhea 4 in a day and soft. Other days formed. No bleeding. No EIMs of IBD. To have labs at PCP next months. Plan was to start oral mesalamine.   1/10/2023: Flare for the past 2 weeks with pressure in abdomen and progressive loose stools with bleeding. No EIM's of IBD. Was  not able to get generic mesalamine that was rx'd lst visit and on no maitenance therapy. Plan was to start Zeposia and bridge with prednisone.   Pt took 20mg prednisone for 4 weeks with mild improvement but Zeposia approval waiting. Called 3/2023 with worsening symptoms again and 4 more weeks of Prednisone called in. Pt states minimal change in symptoms. Prednisone stopped aroung same time Zeposia was started 4 weeks ago.   5/2/2023: For a follow up. On Zepsosia for 4 weeks and off Prednisone. States still BM in AM and small volume with urgency and some blood. Rest of day 2 BM and more formed. No weight loss. No fevers. No EIM's of IBD. Plan was to continue Zeposia and start Budesonide. Check fecal calpro.  7/25/2023: For a follow up; is better since last visit. On Zeposia once a day and taking 9mg Budesonide ER. 2-4 BM and no blood and less urgency. No fatigue or EIM's of IBD.  11/16/23: BM 1-3 times a day of formed stool while on Zeposia once daily and Budesonide 9mg. Since weaning off Budesonide one month ago, he has had looser stools but denies blood in stool. Occasionally, he has stomach cramping. His weight has been steady. Denies recent illness or rashes. Overall, he is much improved since last year.

## 2024-02-20 ENCOUNTER — OV EP (OUTPATIENT)
Dept: URBAN - METROPOLITAN AREA CLINIC 48 | Facility: CLINIC | Age: 34
End: 2024-02-20

## 2024-05-01 ENCOUNTER — TELEPHONE ENCOUNTER (OUTPATIENT)
Dept: URBAN - METROPOLITAN AREA CLINIC 46 | Facility: CLINIC | Age: 34
End: 2024-05-01

## 2024-05-02 ENCOUNTER — DASHBOARD ENCOUNTERS (OUTPATIENT)
Age: 34
End: 2024-05-02

## 2024-05-09 ENCOUNTER — OFFICE VISIT (OUTPATIENT)
Dept: URBAN - METROPOLITAN AREA CLINIC 48 | Facility: CLINIC | Age: 34
End: 2024-05-09

## 2024-05-09 RX ORDER — ZINC GLUCONATE 50 MG
1 TABLET TABLET ORAL ONCE A DAY
Status: ACTIVE | COMMUNITY

## 2024-05-09 RX ORDER — LORATADINE 10 MG/1
1 TABLET TABLET ORAL ONCE A DAY
Status: ACTIVE | COMMUNITY

## 2024-05-09 RX ORDER — OXYCODONE HYDROCHLORIDE AND ACETAMINOPHEN 500 MG
1 TABLET TABLET ORAL ONCE A DAY
Status: ACTIVE | COMMUNITY

## 2024-05-09 RX ORDER — OZANIMOD HYDROCHLORIDE 0.92 MG/1
1 CAPSULE CAPSULE ORAL ONCE A DAY
Qty: 30 | Refills: 5 | Status: ACTIVE | COMMUNITY
Start: 2024-04-30 | End: 2024-10-27

## 2024-05-09 RX ORDER — DEXTROAMPHETAMINE SULFATE, DEXTROAMPHETAMINE SACCHARATE, AMPHETAMINE SULFATE AND AMPHETAMINE ASPARTATE 7.5; 7.5; 7.5; 7.5 MG/1; MG/1; MG/1; MG/1
1 CAPSULE IN THE MORNING CAPSULE, EXTENDED RELEASE ORAL ONCE A DAY
Status: ACTIVE | COMMUNITY

## 2024-05-09 RX ORDER — OZANIMOD HYDROCHLORIDE 0.92 MG/1
1 CAPSULE CAPSULE ORAL ONCE A DAY
Qty: 30 | Refills: 11 | Status: ACTIVE | COMMUNITY

## 2024-05-09 RX ORDER — DICYCLOMINE HYDROCHLORIDE 10 MG/1
TAKE ONE CAPSULE BY MOUTH TWICE A DAY FOR SPASMS CAPSULE ORAL
Qty: 60 CAPSULE | Refills: 43 | Status: ACTIVE | COMMUNITY

## 2024-05-09 RX ORDER — CHOLECALCIFEROL (VITAMIN D3) 25 MCG
1 TABLET TABLET,CHEWABLE ORAL ONCE A DAY
Status: ACTIVE | COMMUNITY

## 2024-05-09 RX ORDER — BUDESONIDE 3 MG/1
3 CAPSULES CAPSULE ORAL ONCE A DAY
Qty: 90 CAPSULE | Refills: 2 | Status: ACTIVE | COMMUNITY
Start: 2023-11-16

## 2024-05-09 RX ORDER — BUDESONIDE 3 MG/1
TAKE THREE CAPSULES BY MOUTH DAILY CAPSULE, COATED PELLETS ORAL
Qty: 90 CAPSULE | Refills: 3 | Status: ACTIVE | COMMUNITY

## 2024-05-10 ENCOUNTER — TELEPHONE ENCOUNTER (OUTPATIENT)
Dept: URBAN - METROPOLITAN AREA CLINIC 46 | Facility: CLINIC | Age: 34
End: 2024-05-10

## 2024-05-10 RX ORDER — OZANIMOD HYDROCHLORIDE 0.92 MG/1
1 CAPSULE CAPSULE ORAL ONCE A DAY
Qty: 30 | Refills: 6 | OUTPATIENT
Start: 2024-05-10 | End: 2024-12-05

## 2024-08-06 ENCOUNTER — OFFICE VISIT (OUTPATIENT)
Dept: URBAN - METROPOLITAN AREA CLINIC 48 | Facility: CLINIC | Age: 34
End: 2024-08-06